# Patient Record
Sex: FEMALE | Race: AMERICAN INDIAN OR ALASKA NATIVE
[De-identification: names, ages, dates, MRNs, and addresses within clinical notes are randomized per-mention and may not be internally consistent; named-entity substitution may affect disease eponyms.]

---

## 2019-04-08 ENCOUNTER — HOSPITAL ENCOUNTER (EMERGENCY)
Dept: HOSPITAL 5 - ED | Age: 64
LOS: 1 days | Discharge: OTHER INSTITUTION | End: 2019-04-09
Payer: OTHER GOVERNMENT

## 2019-04-08 DIAGNOSIS — N28.9: Primary | ICD-10-CM

## 2019-04-08 DIAGNOSIS — E03.9: ICD-10-CM

## 2019-04-08 DIAGNOSIS — R19.7: ICD-10-CM

## 2019-04-08 DIAGNOSIS — E78.00: ICD-10-CM

## 2019-04-08 DIAGNOSIS — Z88.8: ICD-10-CM

## 2019-04-08 DIAGNOSIS — F03.90: ICD-10-CM

## 2019-04-08 DIAGNOSIS — Z98.51: ICD-10-CM

## 2019-04-08 DIAGNOSIS — E11.9: ICD-10-CM

## 2019-04-08 DIAGNOSIS — Z88.6: ICD-10-CM

## 2019-04-08 LAB
ALBUMIN SERPL-MCNC: 4.1 G/DL (ref 3.9–5)
ALT SERPL-CCNC: 14 UNITS/L (ref 7–56)
BILIRUB UR QL STRIP: (no result)
BLOOD UR QL VISUAL: (no result)
BUN SERPL-MCNC: 7 MG/DL (ref 7–17)
BUN/CREAT SERPL: 5 %
CALCIUM SERPL-MCNC: 9.6 MG/DL (ref 8.4–10.2)
HCT VFR BLD CALC: 37.7 % (ref 30.3–42.9)
HEMOLYSIS INDEX: 9
HGB BLD-MCNC: 12.7 GM/DL (ref 10.1–14.3)
MCHC RBC AUTO-ENTMCNC: 34 % (ref 30–34)
MCV RBC AUTO: 86 FL (ref 79–97)
MUCOUS THREADS #/AREA URNS HPF: (no result) /HPF
PH UR STRIP: 5 [PH] (ref 5–7)
PLATELET # BLD: 204 K/MM3 (ref 140–440)
PROT UR STRIP-MCNC: (no result) MG/DL
RBC # BLD AUTO: 4.37 M/MM3 (ref 3.65–5.03)
RBC #/AREA URNS HPF: 1 /HPF (ref 0–6)
UROBILINOGEN UR-MCNC: < 2 MG/DL (ref ?–2)
WBC #/AREA URNS HPF: 6 /HPF (ref 0–6)

## 2019-04-08 PROCEDURE — 80320 DRUG SCREEN QUANTALCOHOLS: CPT

## 2019-04-08 PROCEDURE — 96360 HYDRATION IV INFUSION INIT: CPT

## 2019-04-08 PROCEDURE — 99285 EMERGENCY DEPT VISIT HI MDM: CPT

## 2019-04-08 PROCEDURE — 93010 ELECTROCARDIOGRAM REPORT: CPT

## 2019-04-08 PROCEDURE — 74176 CT ABD & PELVIS W/O CONTRAST: CPT

## 2019-04-08 PROCEDURE — 83735 ASSAY OF MAGNESIUM: CPT

## 2019-04-08 PROCEDURE — 80053 COMPREHEN METABOLIC PANEL: CPT

## 2019-04-08 PROCEDURE — 83690 ASSAY OF LIPASE: CPT

## 2019-04-08 PROCEDURE — 82550 ASSAY OF CK (CPK): CPT

## 2019-04-08 PROCEDURE — 36415 COLL VENOUS BLD VENIPUNCTURE: CPT

## 2019-04-08 PROCEDURE — 71045 X-RAY EXAM CHEST 1 VIEW: CPT

## 2019-04-08 PROCEDURE — 81001 URINALYSIS AUTO W/SCOPE: CPT

## 2019-04-08 PROCEDURE — 85027 COMPLETE CBC AUTOMATED: CPT

## 2019-04-08 PROCEDURE — G0480 DRUG TEST DEF 1-7 CLASSES: HCPCS

## 2019-04-08 PROCEDURE — 93005 ELECTROCARDIOGRAM TRACING: CPT

## 2019-04-08 PROCEDURE — 96361 HYDRATE IV INFUSION ADD-ON: CPT

## 2019-04-08 NOTE — XRAY REPORT
PROCEDURE: XR CHEST 1V AP 

 

TECHNIQUE:  Chest radiograph single view.  

 

HISTORY:  weak , low bp  

 

COMPARISONS: None . 

 

FINDINGS: 

 

Heart: Normal. 

Mediastinum/Vessels: Normal. 

Lungs/Pleural space:  Normal. 

Bony thorax: No acute osseous abnormality. 

Life support devices: None. 

 

IMPRESSION:  No acute cardiopulmonary abnormality. 

 

This document is electronically signed by Rebekah Cota DO., April 8 2019 11:55:08 PM ET

## 2019-04-09 VITALS — SYSTOLIC BLOOD PRESSURE: 114 MMHG | DIASTOLIC BLOOD PRESSURE: 60 MMHG

## 2019-04-09 NOTE — CAT SCAN REPORT
PROCEDURE: CT ABDOMEN PELVIS WO CON 

 

TECHNIQUE:  Routine axial imaging was obtained of the abdomen and pelvis without oral or IV contrast.
 Sagittal and coronal reconstructions were reviewed. 

 

HISTORY: weak hx fo diarrhea 

 

COMPARISONS: None  

 

FINDINGS: 

 

Images through the lung bases reveal bilateral small effusions. There is a 7.1 mm noncalcified nodule
 in the left lower lobe. 

The liver is normal in size and reveal scattered benign appearing calcifications. The gallbladder and
 biliary tree appear normal. The pancreas, spleen, and adrenal glands appear normal. The kidneys show
 no evidence of hydronephrosis. There is a 1 mm nonobstructing calcifications centrally in the left k
idney. The abdominal aorta is normal in caliber. 

The bowel loops are normal in caliber. There is circumferential mucosal thickening in the colon exten
ding from the mid transverse colon to include the descending colon and sigmoid colon compatible with 
colitis. The appendix is not enlarged. The small bowel loops are normal in caliber. 

There is a small umbilical hernia containing omental fat. There is no evidence of free fluid or adeno
marcus. In the pelvis the uterus and bladder appear normal. The skeletal structures reveal degenerativ
e arthritic changes of the lower lumbar spine. 

 

IMPRESSION:  

 

Nonspecific colitis involving the mid transverse colon, descending colon and sigmoid colon. 

Small pleural effusions. 7.1 mm noncalcified nodule on the left lower lobe. Follow-up study is recomm
ended in 3-6 months to confirm stability. 

Scattered calcifications in the liver most likely inflammatory in origin. 

Small umbilical hernia containing omental fat. 

Degenerative arthritic changes in the lower lumbar spine.. 

 

This document is electronically signed by Selvin Omalley MD., April 9 2019 12:55:02 AM ET

## 2019-04-09 NOTE — EMERGENCY DEPARTMENT REPORT
ED General Adult HPI





- General


Chief complaint: Weakness


Stated complaint: DIZZINESS/DIARRHEA


Time Seen by Provider: 19 22:12


Source: patient, family, EMS (ems notes not available at time of  chart 

dictation), RN notes reviewed


Mode of arrival: Stretcher


Limitations: Physical Limitation, Other (patient is demented.  Patient is a poor

historian.)





- History of Present Illness


Initial comments: 





Physician 63-year-old female.  The patient is not known to this provider 

previously.





Patient is a resident at a local assisted living facility.  The patient presents

to the emergency room today with a complaint of "I don't know which wrong with 

me."





Apparently, the patient was sent to the emergency room by her assisted-living 

facility for evaluation of diarrhea.





The patient doesn't know how long she's been having diarrhea 4.  She denies 

physical pain at this time.  She is asking to eat and drink.





Apparently, the patient was found with an unknown pill next to her, questionable

diet pill.





She's not sure what it is.  She doesn't believe that she is taking it, but she 

is not certain.





The patient is accompanied by her brother, Mr. Jerry Berger; [phone]





He reports the patient appears to be at her baseline.  He does not think that 

there is anything quite out of the ordinary about her today.  He is currently 

asking to take her back to [facility]














-: unknown (apparently diarrhea happened today.  However, patient cannot 

recall.)


Severity scale (0 -10): 0


Quality: other (patient not able to describe qualitative nature of symptoms, and

she reports that she's not having any symptoms, with the exception of resolved 

diarrhea.)


Consistency: other (there is no consistency that the patient can recall.)


Improves with: other (patient indicates no exacerbating or relieving factors 

that she is aware of.)





- Related Data


                                  Previous Rx's











 Medication  Instructions  Recorded  Last Taken  Type


 


Ciprofloxacin HCl [Cipro] 500 mg PO BID #10 tablet 19 Unknown Rx


 


metroNIDAZOLE [Flagyl] 500 mg PO Q8HR #15 tab 19 Unknown Rx











                                    Allergies











Allergy/AdvReac Type Severity Reaction Status Date / Time


 


insulin detemir Allergy  Unknown Verified 19 22:15





[From Levemir U-100 Insulin]     


 


metformin Allergy  Unknown Verified 19 22:15


 


pravastatin [From Pravachol] Allergy  Unknown Verified 19 22:15


 


simvastatin Allergy  Unknown Verified 19 22:15














ED Review of Systems


ROS: 


Stated complaint: DIZZINESS/DIARRHEA


Other details as noted in HPI





Constitutional: malaise, weakness.  denies: fever


Eyes: denies: vision change


ENT: denies: epistaxis


Respiratory: denies: wheezing


Cardiovascular: denies: chest pain


Gastrointestinal: diarrhea.  denies: nausea, vomiting


Genitourinary: denies: dysuria


Musculoskeletal: denies: back pain


Skin: denies: lesions


Neurological: weakness (chronic weakness), confusion (chronic confusion)





ED Past Medical Hx





- Past Medical History


Previous Medical History?: Yes


Hx Diabetes: Yes


Hx Dementia: Yes


Additional medical history: hypothyroidism, hypercholesterolemia





- Surgical History


Past Surgical History?: Yes


Additional Surgical History: tubal ligation





- Social History


Smoking Status: Never Smoker


Substance Use Type: None





- Medications


Home Medications: 


                                Home Medications











 Medication  Instructions  Recorded  Confirmed  Last Taken  Type


 


Ciprofloxacin HCl [Cipro] 500 mg PO BID #10 tablet 19  Unknown Rx


 


metroNIDAZOLE [Flagyl] 500 mg PO Q8HR #15 tab 19  Unknown Rx














ED Physical Exam





- General


Limitations: Other (patient is demented.  Patient is a poor historian)


General appearance: alert, in no apparent distress





- Head


Head exam: Present: atraumatic, normocephalic





- Eye


Eye exam: Present: normal appearance, EOMI.  Absent: nystagmus





- ENT


ENT exam: Present: normal exam, normal orophraynx, mucous membranes moist, 

normal external ear exam





- Neck


Neck exam: Present: normal inspection, full ROM.  Absent: tenderness, 

meningismus





- Respiratory


Respiratory exam: Present: normal lung sounds bilaterally.  Absent: respiratory 

distress





- Cardiovascular


Cardiovascular Exam: Present: regular rate, normal rhythm, normal heart sounds. 

 Absent: bradycardia, tachycardia, irregular rhythm, systolic murmur, diastolic 

murmur, rubs, gallop





- GI/Abdominal


GI/Abdominal exam: Present: soft.  Absent: distended, tenderness, guarding, 

rebound, rigid, pulsatile mass





- Rectal


Rectal exam: Present: normal inspection, other (chaperoned by KVNG Carvalho).  Absent: heme (-) stool, black stool, bloody stool





- Extremities Exam


Extremities exam: Present: normal inspection, full ROM, other (2+ pulses noted 

in the bilateral upper, lower extremities.  Compartments soft.  No long bony 

tenderness.  The pelvis is stable.).  Absent: tenderness, joint swelling, calf 

tenderness





- Back Exam


Back exam: Present: normal inspection, full ROM.  Absent: tenderness, CVA 

tenderness (R), paraspinal tenderness, vertebral tenderness





- Neurological Exam


Neurological exam: Present: alert (history is alert to name.  Patient follows 

commands.), other (Extraocular movements intact.  Tongue midline.  No facial 

droop.  Facial sensation intact to light touch in the V1, V2, V3 distribution 

bilaterally.  5 and 5 strength in 4 extremities..  Sensation is intact to light 

touch in 4 extremities.).  Absent: motor sensory deficit





- Psychiatric


Psychiatric exam: Present: flat affect





- Skin


Skin exam: Present: warm, dry, intact, normal color.  Absent: rash





ED Course


                                   Vital Signs











  19





  22:04 22:09 23:37


 


Temperature 98.2 F  99.3 F


 


Pulse Rate 87  


 


Respiratory 12 15 





Rate   


 


Blood Pressure 96/60  


 


Blood Pressure 96/60  





[Right]   


 


O2 Sat by Pulse 96 100 





Oximetry   














  19





  00:00


 


Temperature 


 


Pulse Rate 82


 


Respiratory 15





Rate 


 


Blood Pressure 96/62


 


Blood Pressure 





[Right] 


 


O2 Sat by Pulse 95





Oximetry 














- Reevaluation(s)


Reevaluation #1: 





19 01:14


Serum toxicology studies unremarkable.  Medications reviewed at this point in 

time.


Reevaluation #2: 





19 01:22


Blood pressure 102/70.  It is improving.  Belly soft on repeat exam.  Patient 

suitable for discharge.





ED Medical Decision Making





- Lab Data


Result diagrams: 


                                 19 22:53





                                 19 22:53








                                   Vital Signs











  19





  22:04 22:09 23:37


 


Temperature 98.2 F  99.3 F


 


Pulse Rate 87  


 


Respiratory 12 15 





Rate   


 


Blood Pressure 96/60  


 


Blood Pressure 96/60  





[Right]   


 


O2 Sat by Pulse 96 100 





Oximetry   














  19





  00:00


 


Temperature 


 


Pulse Rate 82


 


Respiratory 15





Rate 


 


Blood Pressure 96/62


 


Blood Pressure 





[Right] 


 


O2 Sat by Pulse 95





Oximetry 











                                   Lab Results











  19 Range/Units





  22:53 22:53 23:00 


 


WBC  8.7    (4.5-11.0)  K/mm3


 


RBC  4.37    (3.65-5.03)  M/mm3


 


Hgb  12.7    (10.1-14.3)  gm/dl


 


Hct  37.7    (30.3-42.9)  %


 


MCV  86    (79-97)  fl


 


MCH  29    (28-32)  pg


 


MCHC  34    (30-34)  %


 


RDW  15.3 H    (13.2-15.2)  %


 


Plt Count  204    (140-440)  K/mm3


 


Sodium   139   (137-145)  mmol/L


 


Potassium   3.9   (3.6-5.0)  mmol/L


 


Chloride   100.7   ()  mmol/L


 


Carbon Dioxide   26   (22-30)  mmol/L


 


Anion Gap   16   mmol/L


 


BUN   7   (7-17)  mg/dL


 


Creatinine   1.5 H   (0.7-1.2)  mg/dL


 


Estimated GFR   42   ml/min


 


BUN/Creatinine Ratio   5   %


 


Glucose   207 H   ()  mg/dL


 


Calcium   9.6   (8.4-10.2)  mg/dL


 


Magnesium   2.10   (1.7-2.3)  mg/dL


 


Total Bilirubin   0.60   (0.1-1.2)  mg/dL


 


AST   21   (5-40)  units/L


 


ALT   14   (7-56)  units/L


 


Alkaline Phosphatase   76   ()  units/L


 


Total Creatine Kinase   241 H   ()  units/L


 


Total Protein   6.6   (6.3-8.2)  g/dL


 


Albumin   4.1   (3.9-5)  g/dL


 


Albumin/Globulin Ratio   1.6   %


 


Lipase   17   (13-60)  units/L


 


Urine Color    Yellow  (Yellow)  


 


Urine Turbidity    Slightly-cloudy  (Clear)  


 


Urine pH    5.0  (5.0-7.0)  


 


Ur Specific Gravity    1.009  (1.003-1.030)  


 


Urine Protein    <15 mg/dl  (Negative)  mg/dL


 


Urine Glucose (UA)    Neg  (Negative)  mg/dL


 


Urine Ketones    Neg  (Negative)  mg/dL


 


Urine Blood    Neg  (Negative)  


 


Urine Nitrite    Neg  (Negative)  


 


Urine Bilirubin    Neg  (Negative)  


 


Urine Urobilinogen    < 2.0  (<2.0)  mg/dL


 


Ur Leukocyte Esterase    Neg  (Negative)  


 


Urine WBC (Auto)    6.0  (0.0-6.0)  /HPF


 


Urine RBC (Auto)    1.0  (0.0-6.0)  /HPF


 


U Epithel Cells (Auto)    < 1.0  (0-13.0)  /HPF


 


Urine Mucus    3+  /HPF


 


Salicylates     (2.8-20.0)  mg/dL


 


Acetaminophen     (10.0-30.0)  ug/mL














  19 Range/Units





  23:18 23:18 


 


WBC    (4.5-11.0)  K/mm3


 


RBC    (3.65-5.03)  M/mm3


 


Hgb    (10.1-14.3)  gm/dl


 


Hct    (30.3-42.9)  %


 


MCV    (79-97)  fl


 


MCH    (28-32)  pg


 


MCHC    (30-34)  %


 


RDW    (13.2-15.2)  %


 


Plt Count    (140-440)  K/mm3


 


Sodium    (137-145)  mmol/L


 


Potassium    (3.6-5.0)  mmol/L


 


Chloride    ()  mmol/L


 


Carbon Dioxide    (22-30)  mmol/L


 


Anion Gap    mmol/L


 


BUN    (7-17)  mg/dL


 


Creatinine    (0.7-1.2)  mg/dL


 


Estimated GFR    ml/min


 


BUN/Creatinine Ratio    %


 


Glucose    ()  mg/dL


 


Calcium    (8.4-10.2)  mg/dL


 


Magnesium    (1.7-2.3)  mg/dL


 


Total Bilirubin    (0.1-1.2)  mg/dL


 


AST    (5-40)  units/L


 


ALT    (7-56)  units/L


 


Alkaline Phosphatase    ()  units/L


 


Total Creatine Kinase    ()  units/L


 


Total Protein    (6.3-8.2)  g/dL


 


Albumin    (3.9-5)  g/dL


 


Albumin/Globulin Ratio    %


 


Lipase    (13-60)  units/L


 


Urine Color    (Yellow)  


 


Urine Turbidity    (Clear)  


 


Urine pH    (5.0-7.0)  


 


Ur Specific Gravity    (1.003-1.030)  


 


Urine Protein    (Negative)  mg/dL


 


Urine Glucose (UA)    (Negative)  mg/dL


 


Urine Ketones    (Negative)  mg/dL


 


Urine Blood    (Negative)  


 


Urine Nitrite    (Negative)  


 


Urine Bilirubin    (Negative)  


 


Urine Urobilinogen    (<2.0)  mg/dL


 


Ur Leukocyte Esterase    (Negative)  


 


Urine WBC (Auto)    (0.0-6.0)  /HPF


 


Urine RBC (Auto)    (0.0-6.0)  /HPF


 


U Epithel Cells (Auto)    (0-13.0)  /HPF


 


Urine Mucus    /HPF


 


Salicylates  < 0.3 L   (2.8-20.0)  mg/dL


 


Acetaminophen   < 5.0 L  (10.0-30.0)  ug/mL














- EKG Data


-: EKG Interpreted by Me


EKG shows normal: sinus rhythm, axis (there is a right axis deviation.)





- EKG Data


When compared to previous EKG there are: previous EKG unavailable





19 01:10


This is a normal sinus rhythm, 80 bpm, right axis deviation, borderline left 

posterior fascicular block, low voltage, poor R-wave progression, not having 

chest pain, this is an abnormal EKG, this EKG is not consistent with an ST 

elevation myocardial infarction.  There is a prolonged TX interval, suggestive 

of first-degree AV block.








19 01:11








- Radiology Data


Radiology results: report reviewed, image reviewed





Print Report











Referring Physician: OSMANY LOAIZA 


 


Patient Name: PILY MCCARTY 


 


Patient ID: [id number] 


 


YOB: 1955 


 


Sex: Female 


 


Accession: [id number] 


 


Report Date: 2019 


 


Report Status: Finalized 


 


Findings


[facility] [address] [place] [address] Cat

 Scan Report Signed Patient: PILY MCCARTY MR#: [id number] 91 : 1955 

Acct:[id number] Age/Sex: 63 / F ADM Date: 19 Loc: ED Attending Dr: 

Ordering Physician: OSMANY LOAIZA MD Date of Service: 19 Procedure(s):

 CT abdomen pelvis wo con Accession Number(s): [id number] cc: OSMANY LOAIZA MD 

PROCEDURE: CT ABDOMEN PELVIS WO CON TECHNIQUE: Routine axial imaging was 

obtained of the abdomen and pelvis without oral or IV contrast. Sagittal and 

coronal reconstructions were reviewed. HISTORY: weak hx fo diarrhea COMPARISONS:

 None FINDINGS: Images through the lung bases reveal bilateral small effusions. 

There is a 7.1 mm noncalcified nodule in the left lower lobe. The liver is 

normal in size and reveal scattered benign appearing calcifications. The 

gallbladder and biliary tree appear normal. The pancreas, spleen, and adrenal 

glands appear normal. The kidneys show no evidence of hydronephrosis. There is a

 1 mm nonobstructing calcifications centrally in the left kidney. The abdominal 

aorta is normal in caliber. The bowel loops are normal in caliber. There is 

circumferential mucosal thickening in the colon extending from the mid 

transverse colon to include the descending colon and sigmoid colon compatible 

with colitis. The appendix is not enlarged. The small bowel loops are normal in 

caliber. There is a small umbilical hernia containing omental fat. There is no 

evidence of free fluid or adenopathy. In the pelvis the uterus and bladder 

appear normal. The skeletal structures reveal degenerative arthritic changes of 

the lower lumbar spine. IMPRESSION: Nonspecific colitis involving the mid tr

ansverse colon, descending colon and sigmoid colon. Small pleural effusions. 7.1

 mm noncalcified nodule on the left lower lobe. Follow-up study is recommended 

in 3-6 months to confirm stability. Scattered calcifications in the liver most 

likely inflammatory in origin. Small umbilical hernia containing omental fat. 

Degenerative arthritic changes in the lower lumbar spine.. This document is 

electronically signed by Connie Omalley MD., 2019 12:55:02 AM ET 

Transcribed By: RB Dictated By: CONNIE OMALLEY MD Electronically Authenticated 

By: CONNIE OMALLEY MD Signed Date/Time: 19 DD/DT: 19 0008 

TD/TT: 19   














Print Report











Referring Physician: OSMANY LOAIZA 


 


Patient Name: PILY MCCARTY 


 


Patient ID: [id number] 


 


YOB: 1955 


 


Sex: Female 


 


Accession: [id number] 


 


Report Date: 2019 


 


Report Status: Finalized 


 


Findings


[address] [address] 

XRay Report Signed Patient: PILY MCCARTY MR#: [id number] 91 : 1955 

Acct:[id number] Age/Sex: 63 / F ADM Date: 19 Loc: ED Attending Dr: 

Ordering Physician: OSMANY LOAIZA MD Date of Service: 19 Procedure(s):

 XR chest 1V ap Accession Number(s): [id number] cc: OSMANY OLAIZA MD Fluoro 

Time In Minutes: PROCEDURE: XR CHEST 1V AP TECHNIQUE: Chest radiograph single 

view. HISTORY: weak , low bp COMPARISONS: None . FINDINGS: Heart: Normal. 

Mediastinum/Vessels: Normal. Lungs/Pleural space: Normal. Bony thorax: No acute 

osseous abnormality. Life support devices: None. IMPRESSION: No acute 

cardiopulmonary abnormality. This document is electronically signed by Rebekah Carlisle DO., 2019 11:55:08 PM ET Transcribed By: [facility] Dictated By: 

REBEKAH CARLISLE MD Electronically Authenticated By: REBEKAH CARLISLE MD Signed 

Date/Time: 19 DD/DT: 19 TD/TT: 19   














- Medical Decision Making





Differential diagnosis, including but not limited to: Pneumonia, urinary tract 

infection, dehydration, enteritis, colitis





Assessment and plan: 63-year-old female sent to the emergency room for reported 

diarrhea.  Blood pressure in the high 90s.  Appears pleasant, calm and 

cooperative.  Tolerating liquid feeds at this time.  Laboratory studies 

basically unremarkable, has very mild renal insufficiency.  X-ray of the chest 

not consistent with pneumonia, urinalysis is not consistent with urinary tract 

infection.  CT scan suggests a possible transverse to distal colitis.





The patient is suitable for a trial of oral outpatient antibiotics.  She will be

 given IV fluids in the emergency room.  She will be started on ciprofloxacin 

and Flagyl.





She will need to follow up with outpatient primary care, and/or 

gastroenterology.


Critical care attestation.: 


If time is entered above; I have spent that time in minutes in the direct care 

of this critically ill patient, excluding procedure time.








ED Disposition


Clinical Impression: 


 History of diarrhea, Renal insufficiency





Disposition: DC/[place]-70 ANOTHER TYPE HLTHCARE


Is pt being admited?: No


Does the pt Need Aspirin: No


Condition: Stable


Instructions:  Infectious Colitis (ED), Impaired Kidney Function (ED)


Additional Instructions: 


Drink 4-6 cups of border per day for the next 7 days.  Avoid consumption of 

Motrin ibuprofen, Naprosyn, Aleve.





CT scan of the abdomen and pelvis suggests nonspecific inflammation of the large

 intestine.  This may be coming from an infection, or cancer, tumor, malignancy.





Take antibiotics as directed, does not consume alcohol, and follow up with a 

gastroenterologist within the next 4-6 weeks.  Not following up with GI as 

recommended may resultant undiagnosed tumor, cancer, malignancy.





EKG today was incidentally abnormal, without prior for comparison.  Please 

follow-up with a primary care doctor or cardiologist within the next 4-6 weeks 

for incidentally abnormal EKG.





CT scan of the abdomen and pelvis demonstrated nonspecific nodules in the left 

lower lung.  Please follow-up with her primary care doctor for this within the 

next 3-6 months.





Not following up as recommended may resultant undiagnosed cancer, tumor, 

malignancy.





Please return to the emergency room right away with few pain, worsened pain, 

migration of pain, rectal vomiting, change in mental status, confusion, new, 

worse or different symptoms.  





Laboratory studies demonstrated mild decrease in kidney function, likely 

secondary to dehydration.  Follow up with the primary care doctor, or 

nephrologist within the next month to have this further evaluated.








Prescriptions: 


Ciprofloxacin HCl [Cipro] 500 mg PO BID #10 tablet


metroNIDAZOLE [Flagyl] 500 mg PO Q8HR #15 tab


Referrals: 


SONA ALEJANDRO MD [Staff Physician] - 3-5 Days (Kidney specialist)


KEATON DONALD MD [Staff Physician] - 3-5 Days (Gastroenterologist)


[facility] [Provider Group] - 3-5 Days (Gastroenterologist)


[facility] [Provider Group] - 3-5 Days (Cardiology)


[facility] [Provider Group] - 3-5 Days